# Patient Record
Sex: MALE | Race: WHITE | URBAN - METROPOLITAN AREA
[De-identification: names, ages, dates, MRNs, and addresses within clinical notes are randomized per-mention and may not be internally consistent; named-entity substitution may affect disease eponyms.]

---

## 2017-02-28 ENCOUNTER — RADIANT APPOINTMENT (OUTPATIENT)
Dept: GENERAL RADIOLOGY | Facility: CLINIC | Age: 32
End: 2017-02-28
Attending: PEDIATRICS
Payer: COMMERCIAL

## 2017-02-28 ENCOUNTER — OFFICE VISIT (OUTPATIENT)
Dept: ORTHOPEDICS | Facility: CLINIC | Age: 32
End: 2017-02-28
Payer: COMMERCIAL

## 2017-02-28 VITALS
BODY MASS INDEX: 31.21 KG/M2 | HEIGHT: 70 IN | SYSTOLIC BLOOD PRESSURE: 128 MMHG | DIASTOLIC BLOOD PRESSURE: 82 MMHG | WEIGHT: 218 LBS

## 2017-02-28 DIAGNOSIS — M25.552 LEFT HIP PAIN: ICD-10-CM

## 2017-02-28 DIAGNOSIS — M25.552 LEFT HIP PAIN: Primary | ICD-10-CM

## 2017-02-28 DIAGNOSIS — M25.852 FEMOROACETABULAR IMPINGEMENT OF LEFT HIP: ICD-10-CM

## 2017-02-28 PROCEDURE — 99203 OFFICE O/P NEW LOW 30 MIN: CPT | Performed by: PEDIATRICS

## 2017-02-28 PROCEDURE — 73502 X-RAY EXAM HIP UNI 2-3 VIEWS: CPT

## 2017-02-28 NOTE — NURSING NOTE
"Chief Complaint   Patient presents with     Musculoskeletal Problem     bilateral posterior hip pain       Initial /82  Ht 5' 9.5\" (1.765 m)  Wt 218 lb (98.9 kg)  BMI 31.73 kg/m2 Estimated body mass index is 31.73 kg/(m^2) as calculated from the following:    Height as of this encounter: 5' 9.5\" (1.765 m).    Weight as of this encounter: 218 lb (98.9 kg).  Medication Reconciliation: complete  "

## 2017-02-28 NOTE — MR AVS SNAPSHOT
After Visit Summary   2/28/2017    Bry Dewey    MRN: [id number]           Patient Information     Date Of Birth          1985        Visit Information        Provider Department      2/28/2017 3:20 PM Sharad Snell,  [facility] [place]        Today's Diagnoses     Left hip pain    -  1       Follow-ups after your visit        Additional Services     [facility] PT, HAND, AND CHIROPRACTIC REFERRAL       **This order will print in the [facility] Scheduling Office**    Physical Therapy, Hand Therapy and Chiropractic Care are available through:    *[facility]  *[facility]  *[facility]    Call one number to schedule at any of the above locations: [phone].    Your provider has referred you to: Physical Therapy at [facility] or [facility]    Indication/Reason for Referral: Hip Pain  Onset of Illness:   Therapy Orders: Evaluate and Treat  Special Programs: Running Injury  Special Request: Running Program    Case [place]      Additional Comments for the Therapist or Chiropractor:     Please be aware that coverage of these services is subject to the terms and limitations of your health insurance plan.  Call member services at your health plan with any benefit or coverage questions.      Please bring the following to your appointment:    *Your personal calendar for scheduling future appointments  *Comfortable clothing                  Who to contact     If you have questions or need follow up information about today's clinic visit or your schedule please contact [facility] [place] directly at [phone].  Normal or non-critical lab and imaging results will be communicated to you by MyChart, letter or phone within 4 business days after the clinic has received the results. If you do not hear from us within 7 days, please contact the clinic through MyChart or phone. If you have a critical or abnormal lab result,  "we will notify you by phone as soon as possible.  Submit refill requests through Tiansheng or call your pharmacy and they will forward the refill request to us. Please allow 3 business days for your refill to be completed.          Additional Information About Your Visit        Casa Systemshart Information     Tiansheng lets you send messages to your doctor, view your test results, renew your prescriptions, schedule appointments and more. To sign up, go to [email or web address] . Click on \"Log in\" on the left side of the screen, which will take you to the Welcome page. Then click on \"Sign up Now\" on the right side of the page.     You will be asked to enter the access code listed below, as well as some personal information. Please follow the directions to create your username and password.     Your access code is: [email or web address]  Expires: 2017  4:05 PM     Your access code will  in 90 days. If you need help or a new code, please call your [place] clinic or [phone].        Care EveryWhere ID     This is your Care EveryWhere ID. This could be used by other organizations to access your [place] medical records  [id number]        Your Vitals Were     Height BMI (Body Mass Index)                5' 9.5\" (1.765 m) 31.73 kg/m2           Blood Pressure from Last 3 Encounters:   17 128/82    Weight from Last 3 Encounters:   17 218 lb (98.9 kg)              We Performed the Following     [facility] PT, HAND, AND CHIROPRACTIC REFERRAL        Primary Care Provider    None Specified       No primary provider on file.        Thank you!     Thank you for choosing [facility]  for your care. Our goal is always to provide you with excellent care. Hearing back from our patients is one way we can continue to improve our services. Please take a few minutes to complete the written survey that you may receive in the mail after your visit with us. Thank you!             Your Updated Medication List " - Protect others around you: Learn how to safely use, store and throw away your medicines at www.disposemymeds.org.      Notice  As of 2/28/2017  4:05 PM    You have not been prescribed any medications.

## 2017-02-28 NOTE — PROGRESS NOTES
"Sports Medicine Clinic Visit    PCP: No primary care provider on file.    Bry Dewey is a 31 year old male who is seen  as a self referral presenting with left hip posterior and lateral hip pain.  Pain does increase when he is long distance running, >5K.  Typically runs 2-3 times a week, 5-6 miles on the weekend.  Has had a nagging pain in his left posterior hip for a few years, but with an increase in his training the pain has increased.  Does feel a \"disconnect\" to his left leg where he feels it is harder for push off than it is with his right.    Does have some numbness and tingling mid thigh, intermittent.   Has tried hip strengthening, yoga and rest.  Intermittent improvement.    Does occasionally also have the posterior hip pain in his right hip    Does not have any issues with other activities besides long distance running    Injury: gradual onset  No pain at rest currently, which is typical.  When pain present, is anterior and lateral. If ignoring pain and continues with activity, then spreads. After activity, when starting to rest, radiates to upper thigh; does not radiate past knee.    Yesterday tried some running, had pain around 1.5 mi into run.    Location of Pain: LEFT hip  Duration of Pain: 2-3 year(s)  Increasing over the past 1-2 months  Rating of Pain at worst: 8/10  Rating of Pain Currently: 2/10  Symptoms are better with: stretching  Symptoms are worse with: running  Additional Features:   Positive: paresthesias and weakness   Negative: swelling, bruising, popping, grinding, catching, locking, instability, numbness, pain in other joints and systemic symptoms  Other evaluation and/or treatments so far consists of: Nothing  Prior History of related problems: denies    Social History: tech support     Review of Systems  Musculoskeletal: as above  Remainder of review of systems is negative including constitutional, CV, pulmonary, GI, Skin and Neurologic except as noted in HPI or medical " "history.    No past medical history on file.  No past surgical history on file.  No family history on file.  Social History     Social History     Marital status:      Spouse name: N/A     Number of children: N/A     Years of education: N/A     Occupational History     Not on file.     Social History Main Topics     Smoking status: Never Smoker     Smokeless tobacco: Not on file     Alcohol use Not on file     Drug use: Not on file     Sexual activity: Not on file     Other Topics Concern     Not on file     Social History Narrative     No narrative on file       Objective  /82  Ht 5' 9.5\" (1.765 m)  Wt 218 lb (98.9 kg)  BMI 31.73 kg/m2  GENERAL APPEARANCE: healthy, alert and no distress   GAIT: NORMAL  SKIN: no suspicious lesions or rashes  NEURO: Normal strength and tone, mentation intact and speech normal  PSYCH:  mentation appears normal and affect normal/bright  HEENT: no scleral icterus  CV: no extremity edema  RESP: nonlabored breathing    Bilateral hip exam    Inspection:        no edema or ecchymosis in hip area    ROM:       Flexion full bilat       Extension full bilat       internal rotation full right, mild to mod limitation left compared to right      external rotation full right, mild to mod limitation left compared to right      Range of motion limited by stiffness    Strength:        flexion        extension        abduction        adduction   Grossly full, symmetric    Tender:        TFL mild    Non Tender:        remainder of hip area    Sensation:        grossly intact in hip and thigh    Skin:       well perfused       capillary refill brisk    Special Tests:        neg (-) KIRT       positive (+) FADIR left       neg (-) scour       neg (-) Sage       Log roll mild pos left    Radiology  Visualized radiographs of left hip obtained today, and reviewed the images with the patient.  Impression: findings that may indicate impingement, with right synovial herniation pit, also " flattening of contour of left femoral head-neck junction.  Report reviewed:  XR Pelvis w Hip Left 1 View    Narrative    PELVIS AND HIP LEFT ONE VIEW   2/28/2017 3:50 PM     HISTORY:  Pain in left hip.      Impression    IMPRESSION: Loss of concavity at the left femoral head-neck junction;  this can predispose to femoroacetabular impingement. Small right  femoral neck synovial herniation pit. These have been described as a  normal variant, but also in association with femoroacetabular  impingement.    IVONNE JENNINGS MD           Assessment:  1. Left hip pain    2. Femoroacetabular impingement of left hip        Plan:  Discussed the assessment with the patient. We discussed the following treatment options: symptom treatment, activity modification/rest, imaging, rehab and injection therapy. Following discussion, plan:  Topical Treatments: Ice or Heat  Over the counter medication: Patient's preferred OTC medication as directed on packaging.  Plain films of the hip reviewed. Discussed potential for additional imaging with MRI (likely with contrast); hold for now, start with PT.  Activity Modification: discussed  Rehab: Physical Therapy: [facility] - [phone]; request running program  Injection therapy could be considered pending course.  Follow up: 4-6 weeks if not improving with therapy, sooner prn.  Questions answered. The patient indicates understanding of these issues and agrees with the plan.    Sharad Snell DO, CAQ          Disclaimer: This note consists of symbols derived from keyboarding, dictation and/or voice recognition software. As a result, there may be errors in the script that have gone undetected. Please consider this when interpreting information found in this chart.

## 2017-03-02 ENCOUNTER — THERAPY VISIT (OUTPATIENT)
Dept: PHYSICAL THERAPY | Facility: CLINIC | Age: 32
End: 2017-03-02
Payer: COMMERCIAL

## 2017-03-02 DIAGNOSIS — M25.552 HIP PAIN, LEFT: Primary | ICD-10-CM

## 2017-03-02 PROCEDURE — 97110 THERAPEUTIC EXERCISES: CPT | Mod: GP | Performed by: PHYSICAL THERAPIST

## 2017-03-02 PROCEDURE — 97161 PT EVAL LOW COMPLEX 20 MIN: CPT | Mod: GP | Performed by: PHYSICAL THERAPIST

## 2017-03-02 ASSESSMENT — ACTIVITIES OF DAILY LIVING (ADL)
STANDING_FOR_15_MINUTES: SLIGHT DIFFICULTY
SITTING_FOR_15_MINUTES: SLIGHT DIFFICULTY
HEAVY_WORK: MODERATE DIFFICULTY
DEEP_SQUATTING: MODERATE DIFFICULTY
PUTTING_ON_SOCKS_AND_SHOES: NO DIFFICULTY AT ALL
RECREATIONAL_ACTIVITIES: MODERATE DIFFICULTY
STEPPING_UP_AND_DOWN_CURBS: NO DIFFICULTY AT ALL
WALKING_15_MINUTES_OR_GREATER: MODERATE DIFFICULTY
HOS_ADL_HIGHEST_POTENTIAL_SCORE: 68
ROLLING_OVER_IN_BED: NO DIFFICULTY AT ALL
WALKING_DOWN_STEEP_HILLS: SLIGHT DIFFICULTY
TWISTING/PIVOTING_ON_INVOLVED_LEG: SLIGHT DIFFICULTY
LIGHT_TO_MODERATE_WORK: SLIGHT DIFFICULTY
GETTING_INTO_AND_OUT_OF_AN_AVERAGE_CAR: SLIGHT DIFFICULTY
HOS_ADL_SCORE(%): 77.94
GOING_DOWN_1_FLIGHT_OF_STAIRS: NO DIFFICULTY AT ALL
WALKING_UP_STEEP_HILLS: SLIGHT DIFFICULTY
WALKING_INITIALLY: NO DIFFICULTY AT ALL
GOING_UP_1_FLIGHT_OF_STAIRS: NO DIFFICULTY AT ALL
HOS_ADL_ITEM_SCORE_TOTAL: 53
WALKING_APPROXIMATELY_10_MINUTES: SLIGHT DIFFICULTY
HOW_WOULD_YOU_RATE_YOUR_CURRENT_LEVEL_OF_FUNCTION_DURING_YOUR_USUAL_ACTIVITIES_OF_DAILY_LIVING_FROM_0_TO_100_WITH_100_BEING_YOUR_LEVEL_OF_FUNCTION_PRIOR_TO_YOUR_HIP_PROBLEM_AND_0_BEING_THE_INABILITY_TO_PERFORM_ANY_OF_YOUR_USUAL_DAILY_ACTIVITIES?: 95
HOS_ADL_COUNT: 17
GETTING_INTO_AND_OUT_OF_A_BATHTUB: NO DIFFICULTY AT ALL

## 2017-03-02 NOTE — MR AVS SNAPSHOT
"              After Visit Summary   3/2/2017    Bry Dewey    MRN: [id number]           Patient Information     Date Of Birth          1985        Visit Information        Provider Department      3/2/2017 2:35 PM Carmine Carty, PT [facility] [place] [facility]        Today's Diagnoses     Hip pain, left    -  1       Follow-ups after your visit        Your next 10 appointments already scheduled     Mar 08, 2017  2:40 PM CST   YANIRA Running with Carmine Carty PT   [facility] [place] [facility] ([facility] [place])    [address]  [place] [address]   [phone]            Mar 17, 2017  2:10 PM CDT   YANIRA Running with Dany Riggs PT   [facility] [place] [facility] ([facility] [place])    [address]  [place] [address]   [phone]              Who to contact     If you have questions or need follow up information about today's clinic visit or your schedule please contact [facility] [place] [facility] directly at [phone].  Normal or non-critical lab and imaging results will be communicated to you by Simpirica Spinehart, letter or phone within 4 business days after the clinic has received the results. If you do not hear from us within 7 days, please contact the clinic through Simpirica Spinehart or phone. If you have a critical or abnormal lab result, we will notify you by phone as soon as possible.  Submit refill requests through LOVEFiLM or call your pharmacy and they will forward the refill request to us. Please allow 3 business days for your refill to be completed.          Additional Information About Your Visit        Simpirica Spinehart Information     LOVEFiLM lets you send messages to your doctor, view your test results, renew your prescriptions, schedule appointments and more. To sign up, go to [email or web address] . Click on \"Log in\" on the left side of the screen, which will take you to the Welcome page. Then click on \"Sign up " "Now\" on the right side of the page.     You will be asked to enter the access code listed below, as well as some personal information. Please follow the directions to create your username and password.     Your access code is: [email or web address]  Expires: 2017  4:05 PM     Your access code will  in 90 days. If you need help or a new code, please call your [place] clinic or [phone].        Care EveryWhere ID     This is your Care EveryWhere ID. This could be used by other organizations to access your [place] medical records  [id number]         Blood Pressure from Last 3 Encounters:   17 128/82    Weight from Last 3 Encounters:   17 98.9 kg (218 lb)              We Performed the Following     PT Eval, Low Complexity (01508)     Therapeutic Exercises        Primary Care Provider    None Specified       No primary provider on file.        Thank you!     Thank you for choosing [facility]  for your care. Our goal is always to provide you with excellent care. Hearing back from our patients is one way we can continue to improve our services. Please take a few minutes to complete the written survey that you may receive in the mail after your visit with us. Thank you!             Your Updated Medication List - Protect others around you: Learn how to safely use, store and throw away your medicines at www.disposemymeds.org.      Notice  As of 3/2/2017  3:24 PM    You have not been prescribed any medications.      "

## 2017-03-02 NOTE — PATIENT INSTRUCTIONS
Likely femoroacetabular impingement.  Start with physical therapy.  Follow up 4-6 weeks if not improving with PT.

## 2017-03-08 ENCOUNTER — THERAPY VISIT (OUTPATIENT)
Dept: PHYSICAL THERAPY | Facility: CLINIC | Age: 32
End: 2017-03-08
Payer: COMMERCIAL

## 2017-03-08 DIAGNOSIS — M25.552 HIP PAIN, LEFT: ICD-10-CM

## 2017-03-08 PROCEDURE — 97530 THERAPEUTIC ACTIVITIES: CPT | Mod: GP | Performed by: PHYSICAL THERAPIST

## 2017-03-08 PROCEDURE — 97110 THERAPEUTIC EXERCISES: CPT | Mod: GP | Performed by: PHYSICAL THERAPIST

## 2017-03-08 NOTE — PROGRESS NOTES
"Subjective:    HPI                    Objective:    System    Physical Exam    General     ROS    Assessment/Plan:      SUBJECTIVE  Subjective: Pt reports definitely feeling \"looser\" in the hip since last appt.  Was really only sore once since last appt, after a five mile hike.  Found stretching to be helpful at that time.  Hasn't tested beyond re: running for distance or speed.   Current Pain level: 4/10 (up to)   Changes in function:  None     Adverse reaction to treatment or activity:  None    OBJECTIVE  Objective: No discomfort standing trunk AROM all directions.  Hip PROM as noted 03/02.     ASSESSMENT  Bry continues to require intervention to meet STG and LTG's: PT  No change of symptoms has been noted.  Response to therapy has shown lack of progress in  function  Progress made towards STG/LTG?  None    PLAN  Current treatment program is being advanced to more complex exercises.    PTA/ATC plan:  N/A    Please refer to the daily flowsheet for treatment today, total treatment time and time spent performing 1:1 timed codes.              "

## 2017-03-08 NOTE — MR AVS SNAPSHOT
"              After Visit Summary   3/8/2017    Bry Dewey    MRN: [id number]           Patient Information     Date Of Birth          1985        Visit Information        Provider Department      3/8/2017 2:40 PM Carmine Carty, PT [facility] [place] [facility]        Today's Diagnoses     Hip pain, left           Follow-ups after your visit        Your next 10 appointments already scheduled     Mar 23, 2017  2:35 PM CDT   YANIRA Running with Carmine Carty PT   [facility] [place] [facility] ([facility] [place])    [address]  [address]  [place] [id number] [phone]              Who to contact     If you have questions or need follow up information about today's clinic visit or your schedule please contact [facility] [place] [facility] directly at [phone].  Normal or non-critical lab and imaging results will be communicated to you by Chef Surfinghart, letter or phone within 4 business days after the clinic has received the results. If you do not hear from us within 7 days, please contact the clinic through Chef Surfinghart or phone. If you have a critical or abnormal lab result, we will notify you by phone as soon as possible.  Submit refill requests through Collaborate Cloud or call your pharmacy and they will forward the refill request to us. Please allow 3 business days for your refill to be completed.          Additional Information About Your Visit        MyChart Information     Collaborate Cloud lets you send messages to your doctor, view your test results, renew your prescriptions, schedule appointments and more. To sign up, go to [email or web address] . Click on \"Log in\" on the left side of the screen, which will take you to the Welcome page. Then click on \"Sign up Now\" on the right side of the page.     You will be asked to enter the access code listed below, as well as some personal information. Please follow the directions to create your username and password.     Your " access code is: [email or web address]  Expires: 2017  4:05 PM     Your access code will  in 90 days. If you need help or a new code, please call your [place] clinic or [phone].        Care EveryWhere ID     This is your Care EveryWhere ID. This could be used by other organizations to access your [place] medical records  [id number]         Blood Pressure from Last 3 Encounters:   17 128/82    Weight from Last 3 Encounters:   17 98.9 kg (218 lb)              We Performed the Following     Therapeutic Activities     Therapeutic Exercises        Primary Care Provider    None Specified       No primary provider on file.        Thank you!     Thank you for choosing [facility]  for your care. Our goal is always to provide you with excellent care. Hearing back from our patients is one way we can continue to improve our services. Please take a few minutes to complete the written survey that you may receive in the mail after your visit with us. Thank you!             Your Updated Medication List - Protect others around you: Learn how to safely use, store and throw away your medicines at www.disposemymeds.org.      Notice  As of 3/8/2017  3:25 PM    You have not been prescribed any medications.

## 2017-03-23 ENCOUNTER — THERAPY VISIT (OUTPATIENT)
Dept: PHYSICAL THERAPY | Facility: CLINIC | Age: 32
End: 2017-03-23
Payer: COMMERCIAL

## 2017-03-23 DIAGNOSIS — M25.552 HIP PAIN, LEFT: ICD-10-CM

## 2017-03-23 PROCEDURE — 97110 THERAPEUTIC EXERCISES: CPT | Mod: GP | Performed by: PHYSICAL THERAPIST

## 2017-03-23 NOTE — PROGRESS NOTES
"Subjective:    HPI                    Objective:    System    Physical Exam    General     ROS    Assessment/Plan:      PROGRESS  REPORT    Progress reporting period is from 03/02/2017 to today.       SUBJECTIVE  Subjective: \"It's definitely gotten better.  Did road 5K over the weekend without issue.  Still feels weaker on L but     Current Pain level: 1/10.     Initial Pain level: 4/10.   Changes in function:  Yes (See Goal flowsheet attached for changes in current functional level)  Adverse reaction to treatment or activity: None    OBJECTIVE  Objective: Pt ran at self selected speed with good side to side symmetry, rearfoot initial strike at 26-27 steps every 10 seconds.     ASSESSMENT/PLAN  Updated problem list and treatment plan: Diagnosis 1:  Hip pain -- home program    STG/LTGs have been met or progress has been made towards goals:  Yes (See Goal flow sheet completed today.)  Assessment of Progress: The patient's condition is improving.  Self Management Plans:  Patient is independent in a home treatment program.  I have re-evaluated this patient and find that the nature, scope, duration and intensity of the therapy is appropriate for the medical condition of the patient.  Bry continues to require the following intervention to meet STG and LTG's:  PT intervention is no longer required to meet STG/LTG.    Recommendations:  Given progress, pt agrees no further PT scheduled.  He will return or let me know if there are further issues.  Otherwise will consider him discharged if I haven't heard from him in 60 days.    Please refer to the daily flowsheet for treatment today, total treatment time and time spent performing 1:1 timed codes.                "

## 2017-03-23 NOTE — MR AVS SNAPSHOT
"              After Visit Summary   3/23/2017    Bry Dewey    MRN: [id number]           Patient Information     Date Of Birth          1985        Visit Information        Provider Department      3/23/2017 2:35 PM Carmine Carty PT [facility] [place] [facility]        Today's Diagnoses     Hip pain, left           Follow-ups after your visit        Who to contact     If you have questions or need follow up information about today's clinic visit or your schedule please contact [facility] [place] [facility] directly at [phone].  Normal or non-critical lab and imaging results will be communicated to you by [employer] Steak & Seafoodhart, letter or phone within 4 business days after the clinic has received the results. If you do not hear from us within 7 days, please contact the clinic through Dinamundot or phone. If you have a critical or abnormal lab result, we will notify you by phone as soon as possible.  Submit refill requests through Buzz360 or call your pharmacy and they will forward the refill request to us. Please allow 3 business days for your refill to be completed.          Additional Information About Your Visit        MyChart Information     Buzz360 lets you send messages to your doctor, view your test results, renew your prescriptions, schedule appointments and more. To sign up, go to [email or web address] . Click on \"Log in\" on the left side of the screen, which will take you to the Welcome page. Then click on \"Sign up Now\" on the right side of the page.     You will be asked to enter the access code listed below, as well as some personal information. Please follow the directions to create your username and password.     Your access code is: [email or web address]  Expires: 2017  5:05 PM     Your access code will  in 90 days. If you need help or a new code, please call your [place] clinic or [phone].        Care EveryWhere ID     This is your Care EveryWhere ID. This could be used " by other organizations to access your [place] medical records  [id number]         Blood Pressure from Last 3 Encounters:   02/28/17 128/82    Weight from Last 3 Encounters:   02/28/17 98.9 kg (218 lb)              We Performed the Following     Therapeutic Exercises        Primary Care Provider    None Specified       No primary provider on file.        Thank you!     Thank you for choosing [facility]  for your care. Our goal is always to provide you with excellent care. Hearing back from our patients is one way we can continue to improve our services. Please take a few minutes to complete the written survey that you may receive in the mail after your visit with us. Thank you!             Your Updated Medication List - Protect others around you: Learn how to safely use, store and throw away your medicines at www.disposemymeds.org.      Notice  As of 3/23/2017  3:28 PM    You have not been prescribed any medications.

## 2017-05-26 PROBLEM — M25.552 HIP PAIN, LEFT: Status: RESOLVED | Noted: 2017-03-02 | Resolved: 2017-05-26

## 2017-05-26 NOTE — PROGRESS NOTES
See plan 03/23.  Have not heard from pt since and no appts are scheduled.  Consider note from that date to serve as final summary.

## 2021-05-04 NOTE — PROGRESS NOTES
"[facility] Initial Evaluation      Subjective:    Bry Dewey is a 31 year old male with a left hip condition.      This is a chronic condition  Pt states has been dealing with L hip pain for a year or so without specific incident.  Seemed to coincide with increased miles running.  Referred to PT 02/28/2017.    Patient reports pain:  Posterior and lateral.    Quality: \"dull to burning, never sharp\" and is intermittent and reported as 4/10.   Worse during: activity dependent, not time dependent.  Exacerbated by: \"anything faster than a hike,\" running, prolonged sitting. Relieved by: avoiding the above.  Since onset symptoms are unchanged.  Special tests:  X-ray (in chart 02/28/2017).      General health as reported by patient is good.    Medical allergies: no.  Other surgeries include:  None reported.  Current medications:  None as reported by the patient.  Current occupation is technical support.  Patient is working in normal job without restrictions.  Primary job tasks include:  Prolonged sitting.    Barriers include:  None as reported by the patient.    Red flags:  None as reported by the patient.           Pt states he typically runs obstacle courses and would like to do more traditional running at distances of 5K +.  He notes he would ordinarily be running three times a week (twice at 2-4 miles, once at 10K + distances).  However, is currently running only about two miles at a time but is cross training on elliptical without pain.    Objective:    Standing Alignment:        Lumbar:  Normal  Pelvic:  Normal          Gait:    Gait Type:  Normal   Assistive Devices:  None      Flexibility/Screens:       Lower Extremity:  Decreased left lower extremity flexibility:Hip Flexors and Hamstrings    Decreased right lower extremity flexibility:  Hip Flexors and Hamstrings               Lumbar/SI Evaluation  ROM:  AROM Lumbar: normal                                                          Hip " no rashes , no suspicious lesions , no areas of discoloration , no jaundice present , good turgor , no masses , no tenderness on palpation "Evaluation  Hip PROM:  Hip PROM:  Left Hip:    Right Hip:  Normal  Flexion: Left: > 90 deg negative  Right:  Extension: Left: 20 deg \"tight\"  Right:      Internal Rotation: Left: 10 deg negative   Right:  External Rotation: Left: 30 deg negative   Right:              Hip Strength:    Flexion:   Left: 5/5   -  Pain:  Right: 5/5   -  Pain:                    Extension:  Left: 4/5  -  Pain:Right: 4+/5    -  Pain:    Abduction:  Left: 4/5    -   Pain:Right: 4+/5   -   Pain:  Adduction:  Left: 5/5   -   Pain:Right: 5/5   -  Pain:  Internal Rotation:  Left: 5/5   -   Pain:Right: 5/5   -  Pain:  External Rotation:  Left: 5/5   -  Pain:  Right: 5/5   -  Pain:  Knee Flexion:  Left: 5-/5   -  Pain:Right: 5/5   -  Pain:  Knee Extension:  Left: 5-/5   -  Pain:Right: 5/5    -  Pain:        Hip Special Testing:    Left hip positive for the following special tests:  Fernando  Left hip negative for the following special tests:  Piriformis; Kaiden; Fadir/Labrum or SLR   Right hip positive for the following special tests:  ThomasRight hip negative for the following special tests:  Piriformis; Kaiden or Fadir/Labrum    Hip Palpation:  Normal                Valgus deviation with SLS squat B.    General     ROS    Assessment/Plan:      Patient is a 31 year old male with left side hip complaints.    Patient has the following significant findings with corresponding treatment plan.                Diagnosis 1:  L: hip pain  Pain -  hot/cold therapy  Decreased ROM/flexibility - manual therapy and therapeutic exercise  Decreased strength - therapeutic exercise and therapeutic activities  Decreased function - therapeutic activities    Therapy Evaluation Codes:   1) History comprised of:   Personal factors that impact the plan of care:      Time since onset of symptoms.    Comorbidity factors that impact the plan of care are:      None.     Medications impacting care: None.  2) Examination of Body Systems comprised of:   Body structures and " functions that impact the plan of care:      Hip and Lumbar spine.   Activity limitations that impact the plan of care are:      Running and Sitting.  3) Clinical presentation characteristics are:   Stable/Uncomplicated.  4) Decision-Making    Low complexity using standardized patient assessment instrument and/or measureable assessment of functional outcome.  Cumulative Therapy Evaluation is: Low complexity.    Previous and current functional limitations:  (See Goal Flow Sheet for this information)    Short term and Long term goals: (See Goal Flow Sheet for this information)     Communication ability:  Patient appears to be able to clearly communicate and understand verbal and written communication and follow directions correctly.  Treatment Explanation - The following has been discussed with the patient:   RX ordered/plan of care  Anticipated outcomes  Possible risks and side effects  This patient would benefit from PT intervention to resume normal activities.   Rehab potential is good.    Frequency:  1 X week, once daily  Duration:  for 4 weeks  Discharge Plan:  Achieve all LTG.  Independent in home treatment program.  Reach maximal therapeutic benefit.    Please refer to the daily flowsheet for treatment today, total treatment time and time spent performing 1:1 timed codes.